# Patient Record
Sex: MALE | Race: BLACK OR AFRICAN AMERICAN | Employment: UNEMPLOYED | URBAN - METROPOLITAN AREA
[De-identification: names, ages, dates, MRNs, and addresses within clinical notes are randomized per-mention and may not be internally consistent; named-entity substitution may affect disease eponyms.]

---

## 2023-07-09 ENCOUNTER — HOSPITAL ENCOUNTER (EMERGENCY)
Age: 43
Discharge: HOME | End: 2023-07-09
Attending: EMERGENCY MEDICINE
Payer: OTHER MISCELLANEOUS

## 2023-07-09 ENCOUNTER — APPOINTMENT (OUTPATIENT)
Dept: GENERAL RADIOLOGY | Age: 43
End: 2023-07-09
Payer: OTHER MISCELLANEOUS

## 2023-07-09 ENCOUNTER — APPOINTMENT (OUTPATIENT)
Dept: CT IMAGING | Age: 43
End: 2023-07-09
Payer: OTHER MISCELLANEOUS

## 2023-07-09 VITALS
TEMPERATURE: 98.6 F | RESPIRATION RATE: 18 BRPM | SYSTOLIC BLOOD PRESSURE: 150 MMHG | DIASTOLIC BLOOD PRESSURE: 95 MMHG | OXYGEN SATURATION: 97 % | HEART RATE: 95 BPM

## 2023-07-09 DIAGNOSIS — S39.012A STRAIN OF LUMBAR REGION, INITIAL ENCOUNTER: ICD-10-CM
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
## 2023-07-09 DIAGNOSIS — M54.9 MUSCULOSKELETAL BACK PAIN: ICD-10-CM

## 2023-07-09 DIAGNOSIS — S09.90XA INJURY OF HEAD, INITIAL ENCOUNTER: ICD-10-CM

## 2023-07-09 DIAGNOSIS — S70.02XA CONTUSION OF LEFT HIP AND THIGH, INITIAL ENCOUNTER: ICD-10-CM

## 2023-07-09 DIAGNOSIS — S16.1XXA STRAIN OF NECK MUSCLE, INITIAL ENCOUNTER: ICD-10-CM

## 2023-07-09 DIAGNOSIS — S70.12XA CONTUSION OF LEFT HIP AND THIGH, INITIAL ENCOUNTER: ICD-10-CM

## 2023-07-09 PROCEDURE — 72100 X-RAY EXAM L-S SPINE 2/3 VWS: CPT

## 2023-07-09 PROCEDURE — 73502 X-RAY EXAM HIP UNI 2-3 VIEWS: CPT

## 2023-07-09 PROCEDURE — 72125 CT NECK SPINE W/O DYE: CPT

## 2023-07-09 PROCEDURE — 6370000000 HC RX 637 (ALT 250 FOR IP): Performed by: NURSE PRACTITIONER

## 2023-07-09 PROCEDURE — 73030 X-RAY EXAM OF SHOULDER: CPT

## 2023-07-09 PROCEDURE — 72110 X-RAY EXAM L-2 SPINE 4/>VWS: CPT

## 2023-07-09 PROCEDURE — 99284 EMERGENCY DEPT VISIT MOD MDM: CPT

## 2023-07-09 RX ORDER — METHOCARBAMOL 500 MG/1
750 TABLET, FILM COATED ORAL 4 TIMES DAILY
Status: DISCONTINUED | OUTPATIENT
Start: 2023-07-09 | End: 2023-07-09 | Stop reason: HOSPADM

## 2023-07-09 RX ORDER — IBUPROFEN 600 MG/1
600 TABLET ORAL ONCE
Status: COMPLETED | OUTPATIENT
Start: 2023-07-09 | End: 2023-07-09

## 2023-07-09 RX ORDER — HYDROCODONE BITARTRATE AND ACETAMINOPHEN 5; 325 MG/1; MG/1
1 TABLET ORAL EVERY 6 HOURS PRN
Status: DISCONTINUED | OUTPATIENT
Start: 2023-07-09 | End: 2023-07-09 | Stop reason: HOSPADM

## 2023-07-09 RX ORDER — METHOCARBAMOL 750 MG/1
750 TABLET, FILM COATED ORAL 4 TIMES DAILY
Qty: 16 TABLET | Refills: 0 | Status: SHIPPED | OUTPATIENT
Start: 2023-07-09 | End: 2023-07-13

## 2023-07-09 RX ORDER — IBUPROFEN 600 MG/1
600 TABLET ORAL 3 TIMES DAILY PRN
Qty: 30 TABLET | Refills: 0 | Status: SHIPPED | OUTPATIENT
Start: 2023-07-09

## 2023-07-09 RX ADMIN — IBUPROFEN 600 MG: 600 TABLET, FILM COATED ORAL at 17:29

## 2023-07-09 RX ADMIN — METHOCARBAMOL 750 MG: 500 TABLET ORAL at 17:28

## 2023-07-09 RX ADMIN — HYDROCODONE BITARTRATE AND ACETAMINOPHEN 1 TABLET: 5; 325 TABLET ORAL at 17:28

## 2023-07-09 ASSESSMENT — ENCOUNTER SYMPTOMS
COUGH: 0
CHEST TIGHTNESS: 0
COLOR CHANGE: 0
ABDOMINAL PAIN: 0
SHORTNESS OF BREATH: 0
BACK PAIN: 1

## 2023-07-09 ASSESSMENT — PAIN SCALES - GENERAL: PAINLEVEL_OUTOF10: 4

## 2023-07-09 ASSESSMENT — LIFESTYLE VARIABLES
HOW MANY STANDARD DRINKS CONTAINING ALCOHOL DO YOU HAVE ON A TYPICAL DAY: PATIENT DOES NOT DRINK
HOW OFTEN DO YOU HAVE A DRINK CONTAINING ALCOHOL: NEVER

## 2023-09-11 ENCOUNTER — HOSPITAL ENCOUNTER (EMERGENCY)
Age: 43
Discharge: HOME | End: 2023-09-11
Payer: COMMERCIAL

## 2023-09-11 VITALS
SYSTOLIC BLOOD PRESSURE: 164 MMHG | HEART RATE: 84 BPM | DIASTOLIC BLOOD PRESSURE: 94 MMHG | OXYGEN SATURATION: 99 % | TEMPERATURE: 98.8 F | RESPIRATION RATE: 18 BRPM

## 2023-09-11 DIAGNOSIS — H10.12 ALLERGIC CONJUNCTIVITIS OF LEFT EYE: Primary | ICD-10-CM

## 2023-09-11 PROCEDURE — 99283 EMERGENCY DEPT VISIT LOW MDM: CPT

## 2023-09-11 RX ORDER — OLOPATADINE HYDROCHLORIDE 2 MG/ML
1 SOLUTION/ DROPS OPHTHALMIC DAILY
Qty: 2.5 ML | Refills: 0 | Status: SHIPPED | OUTPATIENT
Start: 2023-09-11

## 2023-09-11 NOTE — ED PROVIDER NOTES
data to display    Imaging Interpretation: Not applicable    Telemetry: Not Applicable    ECG Interpretation: N/A    Chronic conditions affecting care: None    Testing considered by not ordered:  None    Discussion with Other Profesionals : None    Social Determinants : None    Records Reviewed : None    In brief, patient presented for evaluation of left eye itching which has been going on intermittently for some time. He denies any history of allergies but this has started since he recently moved to this area. Exam is unremarkable. Visual acuity is normal.  Discussed with patient that this may be secondary to an allergic conjunctivitis. He states he had a similar situation occur when he was temporarily living in [facility] and he was given eyedrops that helped with itching that resolved his symptoms. Will prescribe Pataday drops and encouraged him to follow-up with ophthalmology if symptoms persist.    I am the Primary Clinician of Record. CLINICAL IMPRESSION      1.  Allergic conjunctivitis of left eye          DISPOSITION/PLAN   DISPOSITION Decision To Discharge 09/11/2023 12:45:24 PM      PATIENT REFERREDTO:  Janett Tinoco[address]  [phone]    Schedule an appointment as soon as possible for a visit         DISCHARGE MEDICATIONS:  Discharge Medication List as of 9/11/2023  1:00 PM        START taking these medications    Details   olopatadine (PATADAY) 0.2 % SOLN ophthalmic solution Place 1 drop into the left eye daily, Disp-2.5 mL, R-0Normal             DISCONTINUED MEDICATIONS:  Discharge Medication List as of 9/11/2023  1:00 PM                 (Please note that portions ofthis note were completed with a voice recognition program.  Efforts were made to edit the dictations but occasionally words are mis-transcribed.)    PAUL Nichols CNP (electronically signed)              PAUL Nichols CNP  09/11/23 [id number]

## 2023-09-11 NOTE — CONSULTS
Session ID: [id number]  Language: [place]   ID: #[id number]   Name: Pradip Green

## 2024-04-02 ENCOUNTER — HOSPITAL ENCOUNTER (EMERGENCY)
Age: 44
Discharge: HOME | End: 2024-04-02
Payer: COMMERCIAL

## 2024-04-02 VITALS
TEMPERATURE: 98.3 F | OXYGEN SATURATION: 99 % | HEART RATE: 71 BPM | DIASTOLIC BLOOD PRESSURE: 81 MMHG | RESPIRATION RATE: 16 BRPM | SYSTOLIC BLOOD PRESSURE: 118 MMHG

## 2024-04-02 DIAGNOSIS — K08.89 PAIN, DENTAL: Primary | ICD-10-CM

## 2024-04-02 DIAGNOSIS — K02.9 PAIN DUE TO DENTAL CARIES: ICD-10-CM

## 2024-04-02 DIAGNOSIS — K04.7 DENTAL INFECTION: ICD-10-CM

## 2024-04-02 PROCEDURE — 99283 EMERGENCY DEPT VISIT LOW MDM: CPT

## 2024-04-02 PROCEDURE — 6370000000 HC RX 637 (ALT 250 FOR IP)

## 2024-04-02 RX ORDER — AMOXICILLIN AND CLAVULANATE POTASSIUM 875; 125 MG/1; MG/1
1 TABLET, FILM COATED ORAL 2 TIMES DAILY
Qty: 14 TABLET | Refills: 0 | Status: SHIPPED | OUTPATIENT
Start: 2024-04-02 | End: 2024-04-09

## 2024-04-02 RX ORDER — IBUPROFEN 600 MG/1
600 TABLET ORAL 3 TIMES DAILY PRN
Qty: 30 TABLET | Refills: 0 | Status: SHIPPED | OUTPATIENT
Start: 2024-04-02

## 2024-04-02 RX ORDER — CHLORHEXIDINE GLUCONATE ORAL RINSE 1.2 MG/ML
15 SOLUTION DENTAL 2 TIMES DAILY
Qty: 420 ML | Refills: 0 | Status: SHIPPED | OUTPATIENT
Start: 2024-04-02 | End: 2024-04-16

## 2024-04-02 RX ORDER — IBUPROFEN 600 MG/1
600 TABLET ORAL 3 TIMES DAILY PRN
Qty: 30 TABLET | Refills: 0 | Status: SHIPPED | OUTPATIENT
Start: 2024-04-02 | End: 2024-04-02

## 2024-04-02 RX ORDER — IBUPROFEN 600 MG/1
600 TABLET ORAL ONCE
Status: COMPLETED | OUTPATIENT
Start: 2024-04-02 | End: 2024-04-02

## 2024-04-02 RX ADMIN — IBUPROFEN 600 MG: 600 TABLET, FILM COATED ORAL at 10:21

## 2024-04-02 ASSESSMENT — PAIN DESCRIPTION - PAIN TYPE: TYPE: ACUTE PAIN

## 2024-04-02 ASSESSMENT — PAIN DESCRIPTION - LOCATION: LOCATION: TEETH

## 2024-04-02 ASSESSMENT — PAIN DESCRIPTION - DESCRIPTORS: DESCRIPTORS: SHARP

## 2024-04-02 ASSESSMENT — PAIN SCALES - GENERAL: PAINLEVEL_OUTOF10: 8

## 2024-04-02 ASSESSMENT — PAIN DESCRIPTION - ORIENTATION: ORIENTATION: RIGHT

## 2024-04-02 NOTE — ED PROVIDER NOTES
[facility] EMERGENCY DEPARTMENT  EMERGENCY DEPARTMENT ENCOUNTER        Pt Name: Emmanuel Barthelemy  MRN: [id number]  Birthdate 1980  Date of evaluation: 4/2/2024  Provider: PAUL Hernandez CNP  PCP: No primary care provider on file.  Note Started: 12:56 PM EDT 4/2/24      BEKA. I have evaluated this patient.        CHIEF COMPLAINT       Chief Complaint   Patient presents with    Dental Pain       HISTORY OF PRESENT ILLNESS: 1 or more Elements     History From: Patient    Limitations to history : Language [place] Creole    Social Determinants Significantly Affecting Health : None    Chief Complaint: Dental pain infection    Emmanuel Barthelemy is a 43 y.o. male no significant medical history who presents to ED stating that his right lower back tooth has been hurting since Sunday.  denies any cough or shortness of breath or congestion.  denies any fevers nausea vomiting diarrhea constipation dysuria or hematuria or abdominal pain or chest pain..  Denies any recent antibiotic use.  Denies any airway swelling difficulty swallowing or tolerating secretions.  Denies any facial pain or swelling.  Denies any any eye pain or discharge.  Denies any difficulty with vision or pain with eye movement.  Denies any ear pain or throat pain.  States he does have pain on the right side of his jaw into his ear due to the pain.  States he has not been able to see a dentist.  Is requesting a work note.    Nursing Notes were all reviewed and agreed with or any disagreements were addressed in the HPI.    REVIEW OF SYSTEMS :      Review of Systems    Positives and Pertinent negatives as per HPI.     SURGICAL HISTORY   History reviewed. No pertinent surgical history.    CURRENTMEDICATIONS       Previous Medications    OLOPATADINE (PATADAY) 0.2 % SOLN OPHTHALMIC SOLUTION    Place 1 drop into the left eye daily       ALLERGIES     Patient has no known allergies.    FAMILYHISTORY

## 2024-04-02 NOTE — DISCHARGE INSTRUCTIONS
[facility]:                                                                      Primary Care Physicians    [facility]    Dr. Rome Carranza MD  [facility]  [address]  [place], [place] [address]  [phone]    Janine Edmonds CNP  [facility]  [address]  [place], [place] [address]  [phone]    [facility]    Katty Carranza MD  [facility] [address]  [place], [place] [address]  [phone]      [facility]     Pelon Bingham MD  [address]  [place], [place] [address]  [phone]    Mony Griffiths [facility]  [address] [place] [address]  [phone]    Henrietta Bob [facility]   [address] [place] [address]  [phone]    Josselin Beltran MD  [address]  [place], [place] [address]  [phone]     Femi Dupont MD  [address]  [place], [place] [address]  [phone]    Sharla Hobson PA-C  [address]  [place], [place] [address]  [phone]    Primary Care Providers [facility]    Dr. Moe Nowak MD  [address] [place] [address]  [phone]    Dr. David Duncan MD   [address] [place] [address]  [phone]    Primary Care Providers Lara Pierre MD  [address] [id number]  [phone]       [facility]    Sushila Eugene MD  [address] [phone]    Kadeem Moses CNP  [facility]  [address] [id number]  [phone]       Internal Med    Henrietta Tatum NP  [address] [id number]  [phone]        [facility]    Jennifer Florian MD  [address] [address]  [phone]          Santa Collins CNP  [facility]  [address]  [place], [place] [address] [phone]  Same day and quick  care appointments  Mon.-Fri. 8 a.m.-8 p.m.  Sat. 9 a.m.-1 p.m.

## 2024-12-06 ENCOUNTER — HOSPITAL ENCOUNTER (EMERGENCY)
Age: 44
Discharge: HOME | End: 2024-12-06
Payer: COMMERCIAL

## 2024-12-06 VITALS
DIASTOLIC BLOOD PRESSURE: 89 MMHG | OXYGEN SATURATION: 99 % | TEMPERATURE: 98.3 F | SYSTOLIC BLOOD PRESSURE: 138 MMHG | HEART RATE: 87 BPM | RESPIRATION RATE: 16 BRPM

## 2024-12-06 DIAGNOSIS — K05.10 GINGIVITIS: ICD-10-CM

## 2024-12-06 DIAGNOSIS — R73.9 HYPERGLYCEMIA: ICD-10-CM

## 2024-12-06 DIAGNOSIS — I15.9 SECONDARY HYPERTENSION: Primary | ICD-10-CM

## 2024-12-06 LAB
ANION GAP SERPL CALCULATED.3IONS-SCNC: 14 MMOL/L (ref 9–17)
BASOPHILS # BLD: 0.04 K/UL
BASOPHILS NFR BLD: 1 % (ref 0–1)
BUN SERPL-MCNC: 14 MG/DL (ref 7–20)
CALCIUM SERPL-MCNC: 9.8 MG/DL (ref 8.3–10.6)
CHLORIDE SERPL-SCNC: 98 MMOL/L (ref 99–110)
CO2 SERPL-SCNC: 25 MMOL/L (ref 21–32)
CREAT SERPL-MCNC: 1 MG/DL (ref 0.9–1.3)
EOSINOPHIL # BLD: 0.19 K/UL
EOSINOPHILS RELATIVE PERCENT: 3 % (ref 0–3)
ERYTHROCYTE [DISTWIDTH] IN BLOOD BY AUTOMATED COUNT: 12.7 % (ref 11.7–14.9)
GFR, ESTIMATED: 78 ML/MIN/1.73M2
GLUCOSE SERPL-MCNC: 481 MG/DL (ref 74–99)
HCT VFR BLD AUTO: 47.3 % (ref 42–52)
HGB BLD-MCNC: 16.4 G/DL (ref 13.5–18)
IMM GRANULOCYTES # BLD AUTO: 0.01 K/UL
IMM GRANULOCYTES NFR BLD: 0 %
LYMPHOCYTES NFR BLD: 2.46 K/UL
LYMPHOCYTES RELATIVE PERCENT: 35 % (ref 24–44)
MCH RBC QN AUTO: 28.8 PG (ref 27–31)
MCHC RBC AUTO-ENTMCNC: 34.7 G/DL (ref 32–36)
MCV RBC AUTO: 83.1 FL (ref 78–100)
MONOCYTES NFR BLD: 0.56 K/UL
MONOCYTES NFR BLD: 8 % (ref 0–4)
NEUTROPHILS NFR BLD: 54 % (ref 36–66)
NEUTS SEG NFR BLD: 3.78 K/UL
PLATELET # BLD AUTO: 181 K/UL (ref 140–440)
PMV BLD AUTO: 12.4 FL (ref 7.5–11.1)
POTASSIUM SERPL-SCNC: 4.3 MMOL/L (ref 3.5–5.1)
RBC # BLD AUTO: 5.69 M/UL (ref 4.6–6.2)
SODIUM SERPL-SCNC: 136 MMOL/L (ref 136–145)
WBC OTHER # BLD: 7 K/UL (ref 4–10.5)

## 2024-12-06 PROCEDURE — 99283 EMERGENCY DEPT VISIT LOW MDM: CPT

## 2024-12-06 PROCEDURE — 6370000000 HC RX 637 (ALT 250 FOR IP)

## 2024-12-06 PROCEDURE — 85025 COMPLETE CBC W/AUTO DIFF WBC: CPT

## 2024-12-06 PROCEDURE — 80048 BASIC METABOLIC PNL TOTAL CA: CPT

## 2024-12-06 RX ORDER — METFORMIN HYDROCHLORIDE 500 MG/1
1000 TABLET, EXTENDED RELEASE ORAL
Status: COMPLETED | OUTPATIENT
Start: 2024-12-06 | End: 2024-12-06

## 2024-12-06 RX ORDER — AMLODIPINE BESYLATE 5 MG/1
5 TABLET ORAL DAILY
Qty: 30 TABLET | Refills: 3 | Status: SHIPPED | OUTPATIENT
Start: 2024-12-06

## 2024-12-06 RX ADMIN — METFORMIN HYDROCHLORIDE 1000 MG: 500 TABLET, EXTENDED RELEASE ORAL at 12:49

## 2024-12-06 NOTE — DISCHARGE INSTRUCTIONS
Chè Timoteo Barthelemy,    Ou te isit la jeaneth jansiv senyen .Pandan vizit ou jodia yo te fè tès depistaj jeaneth maladi ak/oswa maladi ki kapab menase fredy epi yo te jwenn yo estab jeaneth egzeyat. Sepandan vizit ou a se jis yon kout men nan hendricks epi li ka chanje. Si nan nenpòt moman maladi ou a pwogrese oswa sentòm yo tamia pi mal, oswa si ou devlope nenpòt lòt enkyetid ke ou john ou bezwen swen ijans, tanpri retounen nan Depatman Ijans.    Dapre travay ou jeaneth rin a, ou pa gen okenn bezwen ijans ki mande admisyon epi ou an sekirite jeaneth w dottie lakay ou ak swivi swen prensipal. Ou te eksprime w john w alèz ak plan sa a. Tanpri kontakte doktè ou oswa espesyalis swen prensipal ou nan pwochen 46 èdtan yo.     Christos nou te diskite a, ou gen prèv dyabèt epi ou BEZWEN wè yon doktè premye swen. Tanpri rele pi wo a    Mwen te ekri ou preskripsyon sa yo: metformin    Tanpri pran tout medikaman christos yo mande yo.  Si w gen nenpòt kesyon tanpri mande famasyen an oswa pale ak PCP w la.  Tanpri retounen nan ER la imedyatman si w gen sentòm ki tamia pi grav, oswa nenpòt lòt sentòm ki konsène w.  Enstriksyon egzeyat sa yo te ba ou ivan de vèbalman ak ekri.    Se te yon plezi jeaneth m pran swen ou epi mwen swete w yon Masood lewis PA-C

## 2024-12-06 NOTE — ED PROVIDER NOTES
[facility] EMERGENCY DEPARTMENT  EMERGENCY DEPARTMENT ENCOUNTER        Pt Name: Emmanuel Barthelemy  MRN: [id number]  Birthdate 1980  Date of evaluation: 12/6/2024  Provider: Patel Bruner PA-C  PCP: No primary care provider on file.  Note Started: 10:26 AM EST 12/6/24      BEKA. I have evaluated this patient.        CHIEF COMPLAINT       Chief Complaint   Patient presents with    Dehydration     Pt presents to the ED with complaints of dry lips and gum bleeding while brushing teeth        HISTORY OF PRESENT ILLNESS: 1 or more Elements     Emmanuel Barthelemy is a 44 y.o. male, [place] Creole speaking only requiring the , who presents complaining of dry lips and bleeding gums when brushing teeth.  Denies any recent travel, denies any history of anemia, denies any recent changes to his diet.  Denies any fever, chills, chest pain    Nursing Notes were all reviewed and agreed with or any disagreements were addressed in the HPI.    Historians other than the patient: none    Limitations to history : Language [place] Creole speaking    Social Determinants Significantly Affecting Health : Patient has significant healthcare illiteracy    Records Reviewed (External and Source): ED visit for dental pain on April 2, 2024    PAST MEDICAL HISTORY      has no past medical history on file.     REVIEW OF SYSTEMS :      Review of Systems    Positives and Pertinent negatives as per HPI.     SURGICAL HISTORY   History reviewed. No pertinent surgical history.    CURRENTMEDICATIONS       Previous Medications    IBUPROFEN (ADVIL;MOTRIN) 600 MG TABLET    Take 1 tablet by mouth 3 times daily as needed for Pain    OLOPATADINE (PATADAY) 0.2 % SOLN OPHTHALMIC SOLUTION    Place 1 drop into the left eye daily       ALLERGIES     Patient has no known allergies.    FAMILYHISTORY     History reviewed. No pertinent family history.     SOCIAL HISTORY       Social History     Tobacco Use